# Patient Record
Sex: FEMALE | Race: WHITE
[De-identification: names, ages, dates, MRNs, and addresses within clinical notes are randomized per-mention and may not be internally consistent; named-entity substitution may affect disease eponyms.]

---

## 2021-04-15 ENCOUNTER — HOSPITAL ENCOUNTER (OUTPATIENT)
Dept: HOSPITAL 75 - RAD FS | Age: 36
End: 2021-04-15
Attending: PEDIATRICS
Payer: COMMERCIAL

## 2021-04-15 DIAGNOSIS — M77.31: ICD-10-CM

## 2021-04-15 DIAGNOSIS — M79.671: Primary | ICD-10-CM

## 2021-04-15 PROCEDURE — 73630 X-RAY EXAM OF FOOT: CPT

## 2021-04-15 NOTE — DIAGNOSTIC IMAGING REPORT
INDICATION: Right foot pain.



EXAMINATION: AP, oblique, and lateral views of the right foot are

obtained.



FINDINGS: No fracture or acute bony abnormality is seen. There is

plantar calcaneal spurring. Joint spaces are unremarkable.



IMPRESSION:



No acute abnormality in the right foot. Some plantar calcaneal

spurring is noted.



Dictated by: 



  Dictated on workstation # [id number]